# Patient Record
(demographics unavailable — no encounter records)

---

## 2020-01-01 NOTE — NUR
1230 ASSESSMENTS DONE, MEASUREMENTS TAKEN, VIT K AND EYE OINTMENT GIVEN. HAT
AND DIAPER APPLIED. ID BANDS APPLIED. FOOTPRINTS DONE. INFANT TOLERATING ROOM
AIR AT 94%, VSS.

## 2020-01-01 NOTE — NUR
FEMALE INFANT BORN VIA  AT 1220. MEC FLUID NOTED BEFORE DELIVERY. DR. GUO TO BULB SUCTION INFANT AND PLACE ON MOTHERS ABDOMEN. INFANT DRIED AND
STIMULATED. GOOD HEART RATE NOTED. MINIMAL RESPIRATION EFFORT. MECONIUM STOOL
NOTED AFTER DELIVERY FROM INFANT.
CORD WAS
CLAMPED AND CUT BY DR. GUO. RN TOOK INFANT OVER TO WARMER TO STIMULATE
AND INITIATE BLOW BY FOR SLOW RESPRIATION EFFORT AND COLOR. STIMULATION
CONTINUED. AT APPROXIMATELY 2 MIN OF AGE PPV APPLIED. NO IMPROVEMENT IN
RESPIRATORY EFFORT AND DECREASE IN HEART RATE. INFANT BROUGHT TO NURSERY AT
1223 WHERE PPV CONTINUED FOR 2 MORE MINUTES AT PRESSURES OF 20-25, FIO2 80,
INITIAL SAT 77% STILL REQUIRING STIMULATION, RR EFFORT INCREASING, HEART RATE
'S.
1225 SPONTANEOUS RESPRIATIONS, SLIGHT CRY
1226 DELEE'D OUT 7 MLS OF GREENISH BROWN THIN FLUID
1227 LEADS APPLIED, INFANT WITH PINK COLOR, CRYING, GOOD TONE. BLOW BY
CONTINUED 2-3 MINUTES LONGER.